# Patient Record
Sex: FEMALE | Race: BLACK OR AFRICAN AMERICAN | Employment: UNEMPLOYED | URBAN - METROPOLITAN AREA
[De-identification: names, ages, dates, MRNs, and addresses within clinical notes are randomized per-mention and may not be internally consistent; named-entity substitution may affect disease eponyms.]

---

## 2023-08-12 ENCOUNTER — HOSPITAL ENCOUNTER (EMERGENCY)
Age: 58
Discharge: HOME | End: 2023-08-12
Payer: COMMERCIAL

## 2023-08-12 VITALS
SYSTOLIC BLOOD PRESSURE: 163 MMHG | HEART RATE: 85 BPM | WEIGHT: 233.91 LBS | BODY MASS INDEX: 37.75 KG/M2 | DIASTOLIC BLOOD PRESSURE: 95 MMHG | OXYGEN SATURATION: 96 % | RESPIRATION RATE: 18 BRPM | TEMPERATURE: 98.5 F

## 2023-08-12 DIAGNOSIS — R03.0 ELEVATED BLOOD PRESSURE READING: ICD-10-CM

## 2023-08-12 DIAGNOSIS — M25.562 ACUTE PAIN OF LEFT KNEE: Primary | ICD-10-CM

## 2023-08-12 PROCEDURE — 99282 EMERGENCY DEPT VISIT SF MDM: CPT

## 2023-08-12 ASSESSMENT — PAIN - FUNCTIONAL ASSESSMENT: PAIN_FUNCTIONAL_ASSESSMENT: 0-10

## 2023-08-12 ASSESSMENT — ENCOUNTER SYMPTOMS
RHINORRHEA: 0
ABDOMINAL PAIN: 0
NAUSEA: 0
BACK PAIN: 0
VOMITING: 0
SHORTNESS OF BREATH: 0
EYE PAIN: 0
COUGH: 0
CONSTIPATION: 0
DIARRHEA: 0
SORE THROAT: 0

## 2023-08-12 ASSESSMENT — PAIN SCALES - GENERAL: PAINLEVEL_OUTOF10: 3

## 2023-08-12 NOTE — ED PROVIDER NOTES
[address] [facility],[address] ENCOUNTER        Pt Name: Burke Hoskins  MRN: [id number]  [address] [facility] 1965  Date of evaluation: 8/12/2023  Provider: LOR Hooper - DMITRI  PCP: Quyen Maciel MD (Inactive)  Note Started: 3:10 PM EDT 8/12/23      UMESH. I have evaluated this patient. CHIEF COMPLAINT       Chief Complaint   Patient presents with    Knee Pain     Left knee pain since Tuesday. Pt states she feels like she turned wrong, today pt was getting dressed and felt a \"pop\" and when pt arrived at hospital the pain went away. Pt took aleve prior to arrival.       HISTORY OF PRESENT ILLNESS: 1 or more Elements     History From: pt            Chief Complaint:vishnu Hoskins is a 56 y.o. female who presents to ed with left knee pain which has resolved  On Tuesday patient was on the ground. She twisted. She felt her patella was high riding since. While she was putting her pants on today because the pain was not improving she heard a popping sound in her patella spontaneously reduced. At this time she is completely pain-free. She took and aleave at about noon. No other trauma. History of hypertension. On amlodipine which she is taking regularly. Nursing Notes were all reviewed and agreed with or any disagreements were addressed in the HPI. REVIEW OF SYSTEMS :      Review of Systems   Constitutional:  Negative for chills, diaphoresis and fever. HENT:  Negative for congestion, rhinorrhea and sore throat. Eyes:  Negative for pain and visual disturbance. Respiratory:  Negative for cough and shortness of breath. Cardiovascular:  Negative for chest pain and leg swelling. Gastrointestinal:  Negative for abdominal pain, constipation, diarrhea, nausea and vomiting. Genitourinary:  Negative for frequency and hematuria. Musculoskeletal:  Negative for back pain and neck pain. Skin:  Negative for rash and wound.    Neurological: reading          DISPOSITION/PLAN     DISPOSITION Decision To Discharge 08/12/2023 03:10:14 PM      PATIENT REFERRED TO:  Neil Wagoner MD  [address]  [phone]    Call in 5 days  if symptoms do not improve      DISCHARGE MEDICATIONS:  New Prescriptions    No medications on file       DISCONTINUED MEDICATIONS:  Discontinued Medications    No medications on file              (Please note that portions of this note were completed with a voice recognition program.  Efforts were made to edit the dictations but occasionally words are mis-transcribed.)    LOR Mason CNP (electronically signed)        LOR Mason CNP  08/12/23 [id number]

## 2023-08-12 NOTE — ED NOTES
Left knee pain since Tuesday. Pt states she feels like she turned wrong, today pt was getting dressed and felt a \"pop\" and when pt arrived at hospital the pain went away.  Pt took aleve prior to arrival.     Moses Clifford RN  08/12/23 [id number]

## 2023-08-12 NOTE — DISCHARGE INSTRUCTIONS
Please follow-up with orthopedics and with your family doctor as we discussed. Return for any worsening symptoms. Please keep an eye on blood pressure as we discussed.     Please continue to take naprosyn(Aleve) 2x day with food for next 3-4 days for pain